# Patient Record
Sex: FEMALE | Race: WHITE | NOT HISPANIC OR LATINO | URBAN - METROPOLITAN AREA
[De-identification: names, ages, dates, MRNs, and addresses within clinical notes are randomized per-mention and may not be internally consistent; named-entity substitution may affect disease eponyms.]

---

## 2017-11-03 ENCOUNTER — DOCTOR'S OFFICE (OUTPATIENT)
Dept: URBAN - METROPOLITAN AREA CLINIC 155 | Facility: CLINIC | Age: 72
Setting detail: OPHTHALMOLOGY
End: 2017-11-03
Payer: MEDICARE

## 2017-11-03 DIAGNOSIS — H01.002: ICD-10-CM

## 2017-11-03 DIAGNOSIS — H04.123: ICD-10-CM

## 2017-11-03 DIAGNOSIS — H01.005: ICD-10-CM

## 2017-11-03 DIAGNOSIS — H25.13: ICD-10-CM

## 2017-11-03 DIAGNOSIS — H01.001: ICD-10-CM

## 2017-11-03 DIAGNOSIS — H01.004: ICD-10-CM

## 2017-11-03 DIAGNOSIS — H25.013: ICD-10-CM

## 2017-11-03 PROCEDURE — 92014 COMPRE OPH EXAM EST PT 1/>: CPT | Performed by: OPHTHALMOLOGY

## 2017-11-03 ASSESSMENT — REFRACTION_MANIFEST
OS_VA3: 20/
OD_VA2: 20/
OD_VA3: 20/
OD_VA1: 20/
OU_VA: 20/
OS_VA3: 20/
OS_VA2: 20/
OD_VA2: 20/
OD_VA3: 20/
OS_VA1: 20/
OU_VA: 20/
OS_VA1: 20/
OS_VA2: 20/
OD_VA1: 20/

## 2017-11-03 ASSESSMENT — REFRACTION_CURRENTRX
OS_SPHERE: +1.50
OS_VPRISM_DIRECTION: SV
OD_AXIS: 040
OS_OVR_VA: 20/
OD_CYLINDER: +1.25
OD_OVR_VA: 20/
OS_CYLINDER: +1.25
OD_SPHERE: -2.25
OD_AXIS: 040
OS_VPRISM_DIRECTION: SV
OS_SPHERE: -1.50
OD_SPHERE: +0.75
OD_OVR_VA: 20/
OD_CYLINDER: +1.25
OS_OVR_VA: 20/
OS_OVR_VA: 20/
OS_AXIS: 170
OD_OVR_VA: 20/
OD_VPRISM_DIRECTION: SV
OS_CYLINDER: +1.25
OS_AXIS: 170
OD_VPRISM_DIRECTION: SV

## 2017-11-03 ASSESSMENT — REFRACTION_OUTSIDERX
OS_ADD: +2.75
OD_VA1: 20/30
OD_SPHERE: -1.75
OS_SPHERE: -1.25
OD_VA3: 20/
OS_VA1: 20/30
OS_AXIS: 180
OD_CYLINDER: +1.00
OD_VA2: 20/30(J2)
OD_ADD: +2.75
OD_AXIS: 030
OU_VA: 20/
OS_VA3: 20/
OS_CYLINDER: +0.75
OS_VA2: 20/30(J2)

## 2017-11-03 ASSESSMENT — SPHEQUIV_DERIVED
OS_SPHEQUIV: -0.75
OD_SPHEQUIV: -2

## 2017-11-03 ASSESSMENT — REFRACTION_AUTOREFRACTION
OS_CYLINDER: +1.00
OS_AXIS: 177
OD_AXIS: 39
OD_SPHERE: -2.50
OD_CYLINDER: +1.00
OS_SPHERE: -1.25

## 2017-11-03 ASSESSMENT — TEAR BREAK UP TIME (TBUT)
OS_TBUT: 1+
OD_TBUT: 1+

## 2017-11-03 ASSESSMENT — VISUAL ACUITY
OS_BCVA: 20/20
OD_BCVA: 20/25

## 2017-11-03 ASSESSMENT — LID EXAM ASSESSMENTS
OD_BLEPHARITIS: RLL RUL 1+
OS_BLEPHARITIS: LLL LUL 1+

## 2017-11-03 ASSESSMENT — CONFRONTATIONAL VISUAL FIELD TEST (CVF)
OS_FINDINGS: FULL
OD_FINDINGS: FULL

## 2019-11-01 ENCOUNTER — DOCTOR'S OFFICE (OUTPATIENT)
Dept: URBAN - METROPOLITAN AREA CLINIC 155 | Facility: CLINIC | Age: 74
Setting detail: OPHTHALMOLOGY
End: 2019-11-01
Payer: MEDICARE

## 2019-11-01 DIAGNOSIS — H52.4: ICD-10-CM

## 2019-11-01 DIAGNOSIS — H01.002: ICD-10-CM

## 2019-11-01 DIAGNOSIS — H25.13: ICD-10-CM

## 2019-11-01 DIAGNOSIS — D31.32: ICD-10-CM

## 2019-11-01 DIAGNOSIS — H01.001: ICD-10-CM

## 2019-11-01 DIAGNOSIS — H25.013: ICD-10-CM

## 2019-11-01 DIAGNOSIS — H01.005: ICD-10-CM

## 2019-11-01 DIAGNOSIS — H01.004: ICD-10-CM

## 2019-11-01 DIAGNOSIS — H04.123: ICD-10-CM

## 2019-11-01 PROCEDURE — 92250 FUNDUS PHOTOGRAPHY W/I&R: CPT | Performed by: OPHTHALMOLOGY

## 2019-11-01 PROCEDURE — 92014 COMPRE OPH EXAM EST PT 1/>: CPT | Performed by: OPHTHALMOLOGY

## 2019-11-01 PROCEDURE — 92015 DETERMINE REFRACTIVE STATE: CPT | Performed by: OPHTHALMOLOGY

## 2019-11-01 ASSESSMENT — KERATOMETRY
OS_K1POWER_DIOPTERS: 44.50
METHOD_AUTO_MANUAL: AUTO
OS_AXISANGLE_DEGREES: 145
OS_K2POWER_DIOPTERS: 45.00
OD_K2POWER_DIOPTERS: 45.50
OD_AXISANGLE_DEGREES: 060
OD_K1POWER_DIOPTERS: 44.75

## 2019-11-01 ASSESSMENT — AXIALLENGTH_DERIVED
OD_AL: 23.4344
OD_AL: 23.1496
OS_AL: 23.1885
OS_AL: 23.3782

## 2019-11-01 ASSESSMENT — REFRACTION_MANIFEST
OD_AXIS: 030
OS_VA3: 20/
OD_SPHERE: -1.75
OS_VA1: 20/
OD_VA3: 20/
OS_VA2: 20/20(J1+)
OS_ADD: +3.00
OU_VA: 20/
OS_AXIS: 180
OS_VA1: 20/20-2
OU_VA: 20/
OS_CYLINDER: +1.25
OD_CYLINDER: +1.25
OS_VA3: 20/
OD_VA1: 20/
OD_VA2: 20/
OS_VA2: 20/
OD_VA3: 20/
OD_ADD: +3.00
OD_VA1: 20/20-2
OD_VA2: 20/20(J1+)
OS_SPHERE: -1.25

## 2019-11-01 ASSESSMENT — SPHEQUIV_DERIVED
OD_SPHEQUIV: -1.125
OS_SPHEQUIV: -0.625
OD_SPHEQUIV: -0.375
OS_SPHEQUIV: -0.125

## 2019-11-01 ASSESSMENT — LID EXAM ASSESSMENTS
OD_BLEPHARITIS: RLL RUL 1+
OS_BLEPHARITIS: LLL LUL 1+

## 2019-11-01 ASSESSMENT — REFRACTION_CURRENTRX
OS_VPRISM_DIRECTION: SV
OD_CYLINDER: +1.25
OS_AXIS: 170
OD_AXIS: 040
OS_OVR_VA: 20/
OD_VPRISM_DIRECTION: SV
OS_OVR_VA: 20/
OD_AXIS: 040
OS_CYLINDER: +1.25
OD_OVR_VA: 20/
OS_OVR_VA: 20/
OD_CYLINDER: +1.25
OD_VPRISM_DIRECTION: SV
OS_AXIS: 170
OS_VPRISM_DIRECTION: SV
OS_CYLINDER: +1.25
OD_SPHERE: +0.75
OD_OVR_VA: 20/
OS_SPHERE: -1.25
OD_SPHERE: -2.00
OD_OVR_VA: 20/
OS_SPHERE: +1.50

## 2019-11-01 ASSESSMENT — REFRACTION_AUTOREFRACTION
OD_AXIS: 170
OS_AXIS: 180
OD_CYLINDER: +0.75
OS_SPHERE: -0.50
OS_CYLINDER: +0.75
OD_SPHERE: -0.75

## 2019-11-01 ASSESSMENT — TEAR BREAK UP TIME (TBUT)
OD_TBUT: 1+ 2+
OS_TBUT: 1+ 2+

## 2019-11-01 ASSESSMENT — VISUAL ACUITY
OD_BCVA: 20/25+2
OS_BCVA: 20/20

## 2019-11-01 ASSESSMENT — CONFRONTATIONAL VISUAL FIELD TEST (CVF)
OS_FINDINGS: FULL
OD_FINDINGS: FULL

## 2021-01-28 ENCOUNTER — APPOINTMENT (RX ONLY)
Dept: URBAN - METROPOLITAN AREA CLINIC 122 | Facility: CLINIC | Age: 76
Setting detail: DERMATOLOGY
End: 2021-01-28

## 2021-01-28 DIAGNOSIS — Z71.89 OTHER SPECIFIED COUNSELING: ICD-10-CM

## 2021-01-28 DIAGNOSIS — L71.8 OTHER ROSACEA: ICD-10-CM | Status: STABLE

## 2021-01-28 DIAGNOSIS — B07.8 OTHER VIRAL WARTS: ICD-10-CM | Status: INADEQUATELY CONTROLLED

## 2021-01-28 DIAGNOSIS — L82.1 OTHER SEBORRHEIC KERATOSIS: ICD-10-CM | Status: STABLE

## 2021-01-28 PROBLEM — D48.5 NEOPLASM OF UNCERTAIN BEHAVIOR OF SKIN: Status: ACTIVE | Noted: 2021-01-28

## 2021-01-28 PROCEDURE — ? FULL BODY SKIN EXAM - DECLINED

## 2021-01-28 PROCEDURE — ? COUNSELING

## 2021-01-28 PROCEDURE — ? TREATMENT REGIMEN

## 2021-01-28 PROCEDURE — 11102 TANGNTL BX SKIN SINGLE LES: CPT

## 2021-01-28 PROCEDURE — 99213 OFFICE O/P EST LOW 20 MIN: CPT | Mod: 25

## 2021-01-28 PROCEDURE — ? BIOPSY BY SHAVE METHOD

## 2021-01-28 ASSESSMENT — LOCATION DETAILED DESCRIPTION DERM
LOCATION DETAILED: RIGHT SUPERIOR UPPER BACK
LOCATION DETAILED: NASAL DORSUM
LOCATION DETAILED: RIGHT MID-UPPER BACK
LOCATION DETAILED: LEFT CENTRAL OCCIPITAL SCALP
LOCATION DETAILED: NASAL SUPRATIP

## 2021-01-28 ASSESSMENT — LOCATION ZONE DERM
LOCATION ZONE: TRUNK
LOCATION ZONE: NOSE
LOCATION ZONE: SCALP

## 2021-01-28 ASSESSMENT — LOCATION SIMPLE DESCRIPTION DERM
LOCATION SIMPLE: SCALP
LOCATION SIMPLE: NOSE
LOCATION SIMPLE: RIGHT UPPER BACK

## 2021-01-28 NOTE — PROCEDURE: BIOPSY BY SHAVE METHOD
Body Location Override (Optional - Billing Will Still Be Based On Selected Body Map Location If Applicable): left tip of nose
Detail Level: Detailed
Depth Of Biopsy: dermis
Was A Bandage Applied: Yes
Size Of Lesion In Cm: 0.4
X Size Of Lesion In Cm: 0
Biopsy Type: H and E
Biopsy Method: Dermablade
Anesthesia Type: 1% lidocaine with epinephrine
Anesthesia Volume In Cc (Will Not Render If 0): 0.5
Hemostasis: Drysol
Wound Care: Petrolatum
Dressing: bandage
Destruction After The Procedure: No
Type Of Destruction Used: Curettage
Curettage Text: The wound bed was treated with curettage after the biopsy was performed.
Cryotherapy Text: The wound bed was treated with cryotherapy after the biopsy was performed.
Electrodesiccation Text: The wound bed was treated with electrodesiccation after the biopsy was performed.
Electrodesiccation And Curettage Text: The wound bed was treated with electrodesiccation and curettage after the biopsy was performed.
Silver Nitrate Text: The wound bed was treated with silver nitrate after the biopsy was performed.
Lab: 6
Consent: Written consent was obtained and risks were reviewed including but not limited to scarring, infection, bleeding, scabbing, incomplete removal, nerve damage and allergy to anesthesia.
Post-Care Instructions: I reviewed with the patient in detail post-care instructions. Patient is to keep the biopsy site dry overnight, and then apply bacitracin twice daily until healed. Patient may apply hydrogen peroxide soaks to remove any crusting.
Notification Instructions: Patient will be notified of biopsy results. However, patient instructed to call the office if not contacted within 2 weeks.
Billing Type: Third-Party Bill
Information: Selecting Yes will display possible errors in your note based on the variables you have selected. This validation is only offered as a suggestion for you. PLEASE NOTE THAT THE VALIDATION TEXT WILL BE REMOVED WHEN YOU FINALIZE YOUR NOTE. IF YOU WANT TO FAX A PRELIMINARY NOTE YOU WILL NEED TO TOGGLE THIS TO 'NO' IF YOU DO NOT WANT IT IN YOUR FAXED NOTE.

## 2021-10-29 ENCOUNTER — DOCTOR'S OFFICE (OUTPATIENT)
Dept: URBAN - METROPOLITAN AREA CLINIC 155 | Facility: CLINIC | Age: 76
Setting detail: OPHTHALMOLOGY
End: 2021-10-29
Payer: MEDICARE

## 2021-10-29 PROCEDURE — 92250 FUNDUS PHOTOGRAPHY W/I&R: CPT | Performed by: OPHTHALMOLOGY

## 2021-10-29 PROCEDURE — 92014 COMPRE OPH EXAM EST PT 1/>: CPT | Performed by: OPHTHALMOLOGY

## 2021-10-29 ASSESSMENT — REFRACTION_MANIFEST
OS_VA1: 20/30+
OS_ADD: +3.00
OD_VA1: 20/30+
OD_ADD: +3.00
OS_VA2: 20/25(J1)
OD_SPHERE: -2.00
OD_VA2: 20/25(J1)
OS_AXIS: 180
OS_SPHERE: -1.25
OD_CYLINDER: +1.25
OD_AXIS: 040
OS_CYLINDER: +1.25

## 2021-10-29 ASSESSMENT — REFRACTION_CURRENTRX
OS_VPRISM_DIRECTION: SV
OS_AXIS: 170
OS_VPRISM_DIRECTION: SV
OD_OVR_VA: 20/
OS_SPHERE: -1.50
OD_OVR_VA: 20/
OD_VPRISM_DIRECTION: SV
OD_SPHERE: -2.25
OS_OVR_VA: 20/
OD_VPRISM_DIRECTION: SV
OD_AXIS: 040
OS_SPHERE: +2.50
OS_OVR_VA: 20/
OS_CYLINDER: +1.25
OD_SPHERE: +2.50
OD_CYLINDER: +1.25

## 2021-10-29 ASSESSMENT — LID EXAM ASSESSMENTS
OS_BLEPHARITIS: LLL LUL 1+
OD_BLEPHARITIS: RLL RUL 1+

## 2021-10-29 ASSESSMENT — TEAR BREAK UP TIME (TBUT)
OD_TBUT: 1+ 2+
OS_TBUT: 1+ 2+

## 2021-10-29 ASSESSMENT — VISUAL ACUITY
OS_BCVA: 20/30+2
OD_BCVA: 20/30-2

## 2021-10-29 ASSESSMENT — KERATOMETRY
OD_K1POWER_DIOPTERS: 44.75
OS_K2POWER_DIOPTERS: 45.25
OD_AXISANGLE_DEGREES: 056
OD_K2POWER_DIOPTERS: 45.50
METHOD_AUTO_MANUAL: AUTO
OS_K1POWER_DIOPTERS: 44.75
OS_AXISANGLE_DEGREES: 136

## 2021-10-29 ASSESSMENT — REFRACTION_AUTOREFRACTION
OS_SPHERE: +0.25
OD_CYLINDER: +0.50
OD_SPHERE: -0.50
OS_AXIS: 005
OS_CYLINDER: +1.00
OD_AXIS: 151

## 2021-10-29 ASSESSMENT — SPHEQUIV_DERIVED
OD_SPHEQUIV: -1.375
OS_SPHEQUIV: -0.625
OS_SPHEQUIV: 0.75
OD_SPHEQUIV: -0.25

## 2021-10-29 ASSESSMENT — CONFRONTATIONAL VISUAL FIELD TEST (CVF)
OS_FINDINGS: FULL
OD_FINDINGS: FULL

## 2021-10-29 ASSESSMENT — TONOMETRY
OS_IOP_MMHG: 16
OD_IOP_MMHG: 16

## 2021-10-29 ASSESSMENT — AXIALLENGTH_DERIVED
OD_AL: 23.1028
OS_AL: 23.2884
OD_AL: 23.5309
OS_AL: 22.7778

## 2022-11-11 ENCOUNTER — DOCTOR'S OFFICE (OUTPATIENT)
Dept: URBAN - METROPOLITAN AREA CLINIC 155 | Facility: CLINIC | Age: 77
Setting detail: OPHTHALMOLOGY
End: 2022-11-11
Payer: MEDICARE

## 2022-11-11 DIAGNOSIS — H25.13: ICD-10-CM

## 2022-11-11 DIAGNOSIS — H01.004: ICD-10-CM

## 2022-11-11 DIAGNOSIS — H01.001: ICD-10-CM

## 2022-11-11 DIAGNOSIS — H01.005: ICD-10-CM

## 2022-11-11 DIAGNOSIS — H04.123: ICD-10-CM

## 2022-11-11 DIAGNOSIS — D31.32: ICD-10-CM

## 2022-11-11 DIAGNOSIS — H01.002: ICD-10-CM

## 2022-11-11 DIAGNOSIS — H52.4: ICD-10-CM

## 2022-11-11 DIAGNOSIS — H25.013: ICD-10-CM

## 2022-11-11 PROCEDURE — 92014 COMPRE OPH EXAM EST PT 1/>: CPT | Performed by: OPHTHALMOLOGY

## 2022-11-11 PROCEDURE — 92250 FUNDUS PHOTOGRAPHY W/I&R: CPT | Performed by: OPHTHALMOLOGY

## 2022-11-11 PROCEDURE — 92015 DETERMINE REFRACTIVE STATE: CPT | Performed by: OPHTHALMOLOGY

## 2022-11-11 ASSESSMENT — TONOMETRY
OD_IOP_MMHG: 16
OS_IOP_MMHG: 17

## 2022-11-11 ASSESSMENT — REFRACTION_MANIFEST
OD_AXIS: 040
OD_VA1: 20/25-2
OD_SPHERE: -2.00
OS_SPHERE: -1.25
OS_CYLINDER: +1.25
OD_VA1: 20/30+
OS_AXIS: 180
OS_VA2: 20/25(J1)
OS_CYLINDER: +1.25
OS_VA1: 20/30
OS_SPHERE: -1.25
OS_VA1: 20/30+
OD_ADD: +3.25
OD_CYLINDER: +1.25
OS_ADD: +3.25
OD_VA2: 20/25(J1)
OD_CYLINDER: +1.25
OS_AXIS: 180
OD_VA2: 20/25(J1)
OD_SPHERE: -2.00
OD_AXIS: 040
OD_ADD: +3.00
OS_ADD: +3.00
OS_VA2: 20/25(J1)

## 2022-11-11 ASSESSMENT — REFRACTION_CURRENTRX
OS_OVR_VA: 20/
OS_OVR_VA: 20/
OS_SPHERE: -1.50
OS_SPHERE: +2.50
OD_CYLINDER: +1.25
OD_OVR_VA: 20/
OS_AXIS: 170
OS_VPRISM_DIRECTION: SV
OD_SPHERE: +2.50
OS_VPRISM_DIRECTION: SV
OD_VPRISM_DIRECTION: SV
OS_CYLINDER: +1.25
OD_VPRISM_DIRECTION: SV
OD_AXIS: 040
OD_OVR_VA: 20/
OD_SPHERE: -2.25

## 2022-11-11 ASSESSMENT — REFRACTION_AUTOREFRACTION
OS_AXIS: 151
OD_CYLINDER: +0.75
OD_AXIS: 168
OD_SPHERE: -0.75
OS_SPHERE: +0.75
OS_CYLINDER: +0.50

## 2022-11-11 ASSESSMENT — VISUAL ACUITY
OD_BCVA: 20/30-2
OS_BCVA: 20/25-2

## 2022-11-11 ASSESSMENT — KERATOMETRY
OS_K1POWER_DIOPTERS: 44.75
OD_K1POWER_DIOPTERS: 44.75
OS_K2POWER_DIOPTERS: 45.25
OD_AXISANGLE_DEGREES: 059
OD_K2POWER_DIOPTERS: 45.50
OS_AXISANGLE_DEGREES: 144
METHOD_AUTO_MANUAL: AUTO

## 2022-11-11 ASSESSMENT — AXIALLENGTH_DERIVED
OD_AL: 23.1496
OD_AL: 23.5309
OS_AL: 23.2884
OS_AL: 23.2884
OD_AL: 23.5309
OS_AL: 22.6874

## 2022-11-11 ASSESSMENT — SPHEQUIV_DERIVED
OS_SPHEQUIV: -0.625
OD_SPHEQUIV: -1.375
OD_SPHEQUIV: -0.375
OD_SPHEQUIV: -1.375
OS_SPHEQUIV: 1
OS_SPHEQUIV: -0.625

## 2022-11-11 ASSESSMENT — CONFRONTATIONAL VISUAL FIELD TEST (CVF)
OD_FINDINGS: FULL
OS_FINDINGS: FULL

## 2022-11-11 ASSESSMENT — TEAR BREAK UP TIME (TBUT)
OD_TBUT: 1+ 2+
OS_TBUT: 1+ 2+

## 2022-11-11 ASSESSMENT — LID EXAM ASSESSMENTS
OS_BLEPHARITIS: LLL LUL 1+
OD_BLEPHARITIS: RLL RUL 1+

## 2024-06-18 ENCOUNTER — DOCTOR'S OFFICE (OUTPATIENT)
Dept: URBAN - METROPOLITAN AREA CLINIC 155 | Facility: CLINIC | Age: 79
Setting detail: OPHTHALMOLOGY
End: 2024-06-18
Payer: MEDICARE

## 2024-06-18 DIAGNOSIS — H25.13: ICD-10-CM

## 2024-06-18 DIAGNOSIS — H04.123: ICD-10-CM

## 2024-06-18 DIAGNOSIS — H01.005: ICD-10-CM

## 2024-06-18 DIAGNOSIS — H01.002: ICD-10-CM

## 2024-06-18 DIAGNOSIS — H01.004: ICD-10-CM

## 2024-06-18 DIAGNOSIS — D31.32: ICD-10-CM

## 2024-06-18 DIAGNOSIS — H25.013: ICD-10-CM

## 2024-06-18 DIAGNOSIS — H01.001: ICD-10-CM

## 2024-06-18 PROCEDURE — 92015 DETERMINE REFRACTIVE STATE: CPT | Performed by: OPHTHALMOLOGY

## 2024-06-18 PROCEDURE — 92250 FUNDUS PHOTOGRAPHY W/I&R: CPT | Performed by: OPHTHALMOLOGY

## 2024-06-18 PROCEDURE — 92014 COMPRE OPH EXAM EST PT 1/>: CPT | Performed by: OPHTHALMOLOGY

## 2024-06-18 ASSESSMENT — CONFRONTATIONAL VISUAL FIELD TEST (CVF)
OD_FINDINGS: FULL
OS_FINDINGS: FULL

## 2024-06-18 ASSESSMENT — LID EXAM ASSESSMENTS
OS_BLEPHARITIS: LLL LUL 1+
OD_BLEPHARITIS: RLL RUL 1+